# Patient Record
Sex: MALE | Race: ASIAN | NOT HISPANIC OR LATINO
[De-identification: names, ages, dates, MRNs, and addresses within clinical notes are randomized per-mention and may not be internally consistent; named-entity substitution may affect disease eponyms.]

---

## 2020-12-01 ENCOUNTER — APPOINTMENT (OUTPATIENT)
Dept: UROLOGY | Facility: CLINIC | Age: 25
End: 2020-12-01
Payer: COMMERCIAL

## 2020-12-01 VITALS
DIASTOLIC BLOOD PRESSURE: 78 MMHG | RESPIRATION RATE: 18 BRPM | SYSTOLIC BLOOD PRESSURE: 132 MMHG | HEIGHT: 68 IN | OXYGEN SATURATION: 98 % | TEMPERATURE: 97.1 F | BODY MASS INDEX: 25.16 KG/M2 | WEIGHT: 166 LBS | HEART RATE: 64 BPM

## 2020-12-01 DIAGNOSIS — R35.0 FREQUENCY OF MICTURITION: ICD-10-CM

## 2020-12-01 DIAGNOSIS — Z00.00 ENCOUNTER FOR GENERAL ADULT MEDICAL EXAMINATION W/OUT ABNORMAL FINDINGS: ICD-10-CM

## 2020-12-01 DIAGNOSIS — R35.1 NOCTURIA: ICD-10-CM

## 2020-12-01 DIAGNOSIS — Z78.9 OTHER SPECIFIED HEALTH STATUS: ICD-10-CM

## 2020-12-01 PROCEDURE — 99072 ADDL SUPL MATRL&STAF TM PHE: CPT

## 2020-12-01 PROCEDURE — 99204 OFFICE O/P NEW MOD 45 MIN: CPT

## 2020-12-01 RX ORDER — MAGNESIUM OXIDE 500 MG
TABLET ORAL
Refills: 0 | Status: ACTIVE | COMMUNITY

## 2020-12-01 NOTE — LETTER BODY
[FreeTextEntry1] : Hans Alfaro MD\par [address], [address],\[place] [address]\par [phone]\par \par Dear Dr. 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 Patient reports urinary frequency and urgency every 1-2 hours. He denies any gross hematuria, dysuria or urinary incontinence. The patient does not smoke. His symptoms are aggravated by hydration. He has occasional nocturia. The patient denies any relieving factors. The patient denies any interference of function. The patient is entirely asymptomatic. He denies any history of glaucoma. He denies any illicit drug use. All other review of systems are negative. He has no cancer in his family medical history. He has no previous surgical history. Past medical history, family history and social history were inquired and were noncontributory to current condition. The patient does not use tobacco. He does drink alcohol. Medications and allergies were reviewed. He has no known allergies to medication. \par \par On examination, the patient is a healthy-appearing man in no acute distress. He is alert and oriented follows commands. He  has normal mood and affect. He is normocephalic. Neck is supple. Respirations are unlabored. Abdomen is soft and nontender. Bladder is nonpalpable. No CVA tenderness. Neurologically he is grossly intact. No peripheral edema. Skin without gross abnormality.\par \par Post-void residual on bladder scan today was 30 cc.\par \par ASSESSMENT: Urinary frequency.\par \par I counseled the patient. I discussed the various etiologies of urinary frequency. I discussed the management options of the will to the patient. Given the moderate symptoms, I recommend that the patient begin a trial of Oxybutynin 5 mg. He denies any history of glaucoma. I discussed the potential side effects of the medication. I counseled the patient on its use and side effects. If the patient develops any side effects, the patient will discontinue the medication and contact me. He will obtain urine culture, urinalysis, chlamydia/GC amplification, and ureaplasma/mycoplasma genital culture today for further evaluation of his condition. Risks and alternatives were discussed. I answered the patient questions. The patient will follow-up as directed and will contact me with any questions or concerns. Thank you for the opportunity to participate in the care of Mr. CARTER. I will keep you updated on his progress.\par \par PLAN: Trial of Oxybutynin 5 mg. Urinalysis. Culture. Ureaplasma/mycoplasma genital culture. Chlamydia/GC amplification. Follow-up in 1 month.

## 2020-12-01 NOTE — ADDENDUM
[FreeTextEntry1] : Entered by Hoang Miller, acting as scribe for Dr. Loi Knight.\par \par The documentation recorded by the scribe accurately reflects the service I personally performed and the decisions made by me.\par

## 2020-12-02 LAB
APPEARANCE: CLEAR
BACTERIA: NEGATIVE
BILIRUBIN URINE: NEGATIVE
BLOOD URINE: NEGATIVE
COLOR: COLORLESS
GLUCOSE QUALITATIVE U: NEGATIVE
HYALINE CASTS: 0 /LPF
KETONES URINE: NEGATIVE
LEUKOCYTE ESTERASE URINE: NEGATIVE
MICROSCOPIC-UA: NORMAL
NITRITE URINE: NEGATIVE
PH URINE: 6
PROTEIN URINE: NEGATIVE
RED BLOOD CELLS URINE: 0 /HPF
SPECIFIC GRAVITY URINE: 1.01
SQUAMOUS EPITHELIAL CELLS: 0 /HPF
UROBILINOGEN URINE: NORMAL
WHITE BLOOD CELLS URINE: 0 /HPF

## 2020-12-03 LAB
BACTERIA UR CULT: NORMAL
C TRACH RRNA SPEC QL NAA+PROBE: NOT DETECTED
N GONORRHOEA RRNA SPEC QL NAA+PROBE: NOT DETECTED
SOURCE AMPLIFICATION: NORMAL

## 2020-12-07 LAB
MYCOPLASMA HOMINIS CULTURE: NEGATIVE
UREAPLASMA CULTURE: NEGATIVE

## 2021-04-19 ENCOUNTER — RX RENEWAL (OUTPATIENT)
Age: 26
End: 2021-04-19

## 2021-04-19 RX ORDER — OXYBUTYNIN CHLORIDE 5 MG/1
5 TABLET, EXTENDED RELEASE ORAL
Qty: 30 | Refills: 6 | Status: ACTIVE | COMMUNITY
Start: 2020-12-01 | End: 1900-01-01